# Patient Record
Sex: FEMALE | Race: WHITE
[De-identification: names, ages, dates, MRNs, and addresses within clinical notes are randomized per-mention and may not be internally consistent; named-entity substitution may affect disease eponyms.]

---

## 2020-05-03 ENCOUNTER — HOSPITAL ENCOUNTER (EMERGENCY)
Dept: HOSPITAL 46 - ED | Age: 30
Discharge: HOME | End: 2020-05-03
Payer: COMMERCIAL

## 2020-05-03 VITALS — BODY MASS INDEX: 22.5 KG/M2 | HEIGHT: 66 IN | WEIGHT: 139.99 LBS
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
## 2020-05-03 DIAGNOSIS — Z87.891: ICD-10-CM

## 2020-05-03 DIAGNOSIS — S91.312A: Primary | ICD-10-CM

## 2020-05-03 PROCEDURE — A9270 NON-COVERED ITEM OR SERVICE: HCPCS

## 2020-12-08 ENCOUNTER — HOSPITAL ENCOUNTER (EMERGENCY)
Dept: HOSPITAL 46 - ED | Age: 30
Discharge: HOME | End: 2020-12-08
Payer: COMMERCIAL

## 2020-12-08 VITALS — HEIGHT: 66 IN | BODY MASS INDEX: 22.5 KG/M2 | WEIGHT: 140 LBS

## 2020-12-08 DIAGNOSIS — Z79.899: ICD-10-CM

## 2020-12-08 DIAGNOSIS — K08.89: Primary | ICD-10-CM

## 2020-12-08 NOTE — XMS
PreManage Notification: KODY LINK MRN:[id number]
 
Security Information
 
Security Events
No recent Security Events currently on file
 
 
 
CRITERIA MET
------------
- [facility]
- [facility] - 2 Visits in 30 Days
 
 
CARE PROVIDERS
-------------------------------------------------------------------------------------
SALVATORE BUSH     Internal Medicine: Pulmonary Disease     09/27/2018-Current
 
PHONE: Unknown
-------------------------------------------------------------------------------------
 
Dina has no Care Guidelines for this patient.
Care History
Medical/Surgical
09/27/2018    [facility]
 
      - Patient is currently established with [facility]. If patient is seen 
      in the ED during business hours. Please contact CHWs at [facility].
      - PATIENT HAS A FOLLOW UP APT WITH DR CHASE ON 09/27/18 FOR L KNEE PAIN/INJURY.
      Care Recommendation: This patient has had 5 or more Emergency Department
      visits in the last 12 months.\T\nbsp; Patient requires education on the scope
      and purpose of the ED as an acute care provider not a Primary Care Provider
      and should not be utilized for chronic conditions.\T\nbsp; These are
      guidelines and the provider should exercise clinical judgment when providing
      care.
E.D. VISIT COUNT (12 MO.)
-------------------------------------------------------------------------------------
3 [facility]
-------------------------------------------------------------------------------------
TOTAL 3
-------------------------------------------------------------------------------------
NOTE: Visits indicate total known visits.
 
ED/UCC VISIT TRACKING (12 MO.)
-------------------------------------------------------------------------------------
12/08/2020 18:14
[facility]
 
TYPE: Emergency
 
COMPLAINT:
- DENTAL PAIN
-------------------------------------------------------------------------------------
12/05/2020 22:02
[facility]
 
TYPE: Emergency
 
 
COMPLAINT:
- DENTAL PAIN
 
DIAGNOSES:
- Other long term (current) drug therapy
- Other specified disorders of teeth and supporting structures
-------------------------------------------------------------------------------------
05/03/2020 11:04
[facility]
 
TYPE: Emergency
 
COMPLAINT:
- FOOT INJURY
 
DIAGNOSES:
- Laceration without foreign body, left foot, initial encounter
- Exposure to other specified factors, initial encounter
- Laceration without foreign body, left foot, initial encounter
- Personal history of nicotine dependence
-------------------------------------------------------------------------------------
 
 
INPATIENT VISIT TRACKING (12 MO.)
No inpatient visits to display in this time frame
 
[email or web address]

## 2020-12-14 ENCOUNTER — HOSPITAL ENCOUNTER (OUTPATIENT)
Dept: HOSPITAL 46 - DS | Age: 30
Discharge: HOME | End: 2020-12-14
Attending: SPECIALIST
Payer: COMMERCIAL

## 2020-12-14 VITALS — WEIGHT: 145.28 LBS | BODY MASS INDEX: 23.35 KG/M2 | HEIGHT: 66 IN

## 2020-12-14 DIAGNOSIS — Z79.1: ICD-10-CM

## 2020-12-14 DIAGNOSIS — G89.18: ICD-10-CM

## 2020-12-14 DIAGNOSIS — Z79.899: ICD-10-CM

## 2020-12-14 DIAGNOSIS — Z87.891: ICD-10-CM

## 2020-12-14 DIAGNOSIS — M25.511: Primary | ICD-10-CM

## 2020-12-14 PROCEDURE — 3E0T3BZ INTRODUCTION OF ANESTHETIC AGENT INTO PERIPHERAL NERVES AND PLEXI, PERCUTANEOUS APPROACH: ICD-10-PCS | Performed by: SPECIALIST

## 2020-12-14 PROCEDURE — 0RJJ4ZZ INSPECTION OF RIGHT SHOULDER JOINT, PERCUTANEOUS ENDOSCOPIC APPROACH: ICD-10-PCS | Performed by: SPECIALIST

## 2020-12-14 PROCEDURE — 3E0T33Z INTRODUCTION OF ANTI-INFLAMMATORY INTO PERIPHERAL NERVES AND PLEXI, PERCUTANEOUS APPROACH: ICD-10-PCS | Performed by: SPECIALIST

## 2020-12-16 NOTE — OR
[facility]
                                    [address] [place] [address], [place]  [address]
_________________________________________________________________________________________
                                                                 Signed   
 
 
DATE OF OPERATION:
12/14/2020
 
SURGEON:
Eliazar Almonte MD
 
PREOPERATIVE DIAGNOSES:
Labral tear, partial rotator cuff tear, right shoulder.
 
POSTOPERATIVE DIAGNOSIS:
Normal postoperative findings.
 
PROCEDURE:
Right shoulder arthroscopy, diagnostic.
 
ASSISTANT:
None.
 
ANESTHESIA:
General.
 
BLOOD LOSS:
Minimal.
 
BRIEF HISTORY:
Tasia is a 30-year-old female with progressive worsening of shoulder pain.  She had
undergone anti-inflammatories and physical therapy without substantial relief.  Risks
and benefits of operative treatment discussed with her and she elected to proceed. 
 
DESCRIPTION OF PROCEDURE:
Once consent was obtained, she was taken to the operating room.  After adequate
anesthesia, she was placed on the operating room table in a beach chair position.  All
downside pressure points well padded.  The shoulder was prepped and draped in a standard
sterile fashion.  Shoulder was injected with 15 mL 0.25% Marcaine with epinephrine as
was subacromial space.  The standard posterior portal was made and the scope was
introduced into the shoulder. 
 
Arthroscopic Findings:  Glenohumeral surfaces were intact.  Biceps, biceps anchor, and
the entire labrum were intact to visualization and palpation.  Undersurface of the
rotator cuff showed no evidence of a partial rotator cuff tear.  The bursal side of the
rotator cuff was completely clear and intact.  The shoulder was examined under
anesthesia.  There was no instability.  There was no erythema, redness, or areas of
 
    Electronically Signed By: ELIAZAR ALMONTE MD  12/16/20 0938
_________________________________________________________________________________________
PATIENT NAME:     TASIA LINK JUNE                     
MEDICAL RECORD #: [id number]            OPERATIVE REPORT              
          ACCT #: [id number]  
DATE OF BIRTH:   [birth year]            REPORT #: [id number]      
PHYSICIAN:        ELIAZAR ALMONTE MD              
PCP:              YNES COON MD    
REPORT IS CONFIDENTIAL AND NOT TO BE RELEASED WITHOUT AUTHORIZATION
 
 
                                  [address]  [address]
_________________________________________________________________________________________
                                                                 Signed   
 
 
induration in the shoulder joint itself. 
 
Standard anterior portal was made using an outside-in technique and the probe was used
to carefully probe the labrum from 9 o'clock all the way anteriorly to about 5 or 6
o'clock.  Again, it was completely intact with no evidence of tearing.  The scope was
withdrawn, placed in subacromial space, which can show normal findings with no bursitis
of any sort.  The scope was then withdrawn.  Portals were closed with 3-0 nylon and
dressed with 
Acticoat dressing.  She tolerated the procedure well.  All sponge, needle, and
instrument counts were correct. 
 
 
 
            ________________________________________
            Eliazar Almonte MD 
 
 
BA/MODL
Job #:  [id number]
DD:  12/14/2020 15:12:08
DT:  12/14/2020 18:20:12
 
 
Copies:                                
~
 
 
 
 
 
 
 
 
 
 
 
 
 
 
 
 
 
 
    Electronically Signed By: ELIAZAR ALMONTE MD  12/16/20 0938
_________________________________________________________________________________________
PATIENT NAME:     TASIA LINK JUNE                     
MEDICAL RECORD #: [id number]            OPERATIVE REPORT              
          ACCT #: [id number]  
DATE OF BIRTH:   [birth year]            REPORT #: [id number]      
PHYSICIAN:        ELIAZAR ALMONTE MD              
PCP:              YNES COON MD    
REPORT IS CONFIDENTIAL AND NOT TO BE RELEASED WITHOUT AUTHORIZATION